# Patient Record
Sex: FEMALE | URBAN - METROPOLITAN AREA
[De-identification: names, ages, dates, MRNs, and addresses within clinical notes are randomized per-mention and may not be internally consistent; named-entity substitution may affect disease eponyms.]

---

## 2017-04-07 ENCOUNTER — IMPORTED ENCOUNTER (OUTPATIENT)
Dept: URBAN - METROPOLITAN AREA CLINIC 38 | Facility: CLINIC | Age: 37
End: 2017-04-07

## 2018-02-17 ENCOUNTER — IMPORTED ENCOUNTER (OUTPATIENT)
Dept: URBAN - METROPOLITAN AREA CLINIC 38 | Facility: CLINIC | Age: 38
End: 2018-02-17

## 2018-06-06 ENCOUNTER — IMPORTED ENCOUNTER (OUTPATIENT)
Dept: URBAN - METROPOLITAN AREA CLINIC 38 | Facility: CLINIC | Age: 38
End: 2018-06-06

## 2019-01-15 ENCOUNTER — IMPORTED ENCOUNTER (OUTPATIENT)
Dept: URBAN - METROPOLITAN AREA CLINIC 38 | Facility: CLINIC | Age: 39
End: 2019-01-15

## 2025-05-12 ENCOUNTER — HOSPITAL ENCOUNTER (OUTPATIENT)
Facility: CLINIC | Age: 45
Discharge: HOME | End: 2025-05-12
Attending: FAMILY MEDICINE
Payer: COMMERCIAL

## 2025-05-12 VITALS
HEART RATE: 88 BPM | TEMPERATURE: 97.4 F | BODY MASS INDEX: 30.51 KG/M2 | OXYGEN SATURATION: 97 % | SYSTOLIC BLOOD PRESSURE: 144 MMHG | WEIGHT: 206 LBS | DIASTOLIC BLOOD PRESSURE: 84 MMHG | RESPIRATION RATE: 18 BRPM | HEIGHT: 69 IN

## 2025-05-12 DIAGNOSIS — S61.213A LACERATION OF LEFT MIDDLE FINGER WITHOUT FOREIGN BODY WITHOUT DAMAGE TO NAIL, INITIAL ENCOUNTER: Primary | ICD-10-CM

## 2025-05-12 PROCEDURE — S9083 URGENT CARE CENTER GLOBAL: HCPCS | Performed by: FAMILY MEDICINE

## 2025-05-12 PROCEDURE — 99203 OFFICE O/P NEW LOW 30 MIN: CPT | Performed by: FAMILY MEDICINE

## 2025-05-12 RX ORDER — CEPHALEXIN 500 MG/1
500 CAPSULE ORAL 3 TIMES DAILY
Qty: 21 CAPSULE | Refills: 0 | Status: SHIPPED | OUTPATIENT
Start: 2025-05-12 | End: 2025-05-19

## 2025-05-22 ENCOUNTER — HOSPITAL ENCOUNTER (OUTPATIENT)
Facility: CLINIC | Age: 45
Discharge: HOME | End: 2025-05-22
Attending: FAMILY MEDICINE
Payer: COMMERCIAL

## 2025-05-22 VITALS — TEMPERATURE: 97.9 F

## 2025-05-22 DIAGNOSIS — Z48.02 VISIT FOR SUTURE REMOVAL: Primary | ICD-10-CM

## 2025-05-22 PROCEDURE — 200200 PR NO CHARGE: Performed by: NURSE PRACTITIONER

## 2025-05-22 NOTE — ED ATTESTATION NOTE
The patient was solely evaluated and managed by the nurse practitioner.  I was present in the office and available for questions/concerns.       Bettina Polo,   05/22/25 1445

## 2025-05-22 NOTE — ED PROVIDER NOTES
Emergency Medicine Note  HPI   HISTORY OF PRESENT ILLNESS     45 y.o. female presents to  for suture removal. She had 6 sutures placed on 5/12/25 after cutting her finger on scissors. No fevers/chills/redness/ or numbness.               Patient History   PAST HISTORY     Reviewed from Nursing Triage:       No past medical history on file.    No past surgical history on file.    No family history on file.           Review of Systems   REVIEW OF SYSTEMS     Review of Systems   All other systems reviewed and are negative.        VITALS     ED Vitals      Date/Time Temp Pulse Resp BP Sp[facility]   05/22/25 1115 36.6 °C (97.9 °F) -- -- -- -- [facility]                         Physical Exam   PHYSICAL EXAM     Physical Exam  Skin:     Findings: Laceration present.      Comments: Left middle finger with well healed laceration.   No erythema or swelling.   Full ROM   Normal sensation.            PROCEDURES     Procedures     DATA     Results       None                No orders to display       Scoring tools                                  ED Course & MDM   MDM / ED COURSE / CLINICAL IMPRESSION / DISPO     Medical Decision Making  # Suture removal  -6 sutures removed without complication   No signs of infection   F/u as needed            Clinical Impression      Visit for suture removal     _________________       ED Disposition   Discharge                       Concepción Resendiz CRNP  05/22/25 [id number]